# Patient Record
(demographics unavailable — no encounter records)

---

## 2024-10-10 NOTE — HISTORY OF PRESENT ILLNESS
[Former] : former [>= 20 pack years] : >= 20 pack years [Never] : never [TextBox_4] : TONY SWAIN is a 75 year old female who presents for copd fu and pfts  PMH: HLD, HTN, PUD, former smoker > 100 pack year, quit in 2021 on trelegy , rinse mouth  doing ok breathing ok  does not want to do HST    pending derm surgery   [TextBox_13] : 55 [YearQuit] : 2021

## 2024-10-10 NOTE — ASSESSMENT
[FreeTextEntry1] : Pulmonary status is maximized. Pulmonary function is adequate to tolerate proposed surgical procedure. Continue present bronchodilator therapy.  Anesthesia should be aware of underlying COPD

## 2024-10-10 NOTE — PROCEDURE
[FreeTextEntry1] : 10/2024 stable restriction on spirometry, normal rv/tlc , reduced dlco that correts PA and lateral chest xray performed using standard projections. Bones and soft tissues structures are unremarkable.Cardiac silhouette appears normal. Lung fields are clear. No infiltrate or masses noted. Mediastinal contours are normal. IMPRESSION: no acute cardiopulmonary disease   previous data reviewed:.p5/2024 R LDCT nodule unchnaged no cardiac calcifications  4/2024 moderate restriction stable, normal volume, reduced dclo that correts  10/2023 improved spirometry, volumes still slightly high, dlco improved compared to june 2023 june 2023- improved flow rates   may 2023- pfts show severe obstruction   LDCT at [facility] 5/2023 reviewed- small nodules unchanged from 2022 small GGO unchanged from 2022

## 2024-10-10 NOTE — PHYSICAL EXAM
[No Acute Distress] : no acute distress [Well Nourished] : well nourished [Well Developed] : well developed [IV] : Mallampati Class: IV [No Resp Distress] : no resp distress [No Acc Muscle Use] : no acc muscle use [Clear to Auscultation Bilaterally] : clear to auscultation bilaterally [No Focal Deficits] : no focal deficits [Oriented x3] : oriented x3 [TextBox_132] : assist device

## 2025-01-09 NOTE — PROCEDURE
[FreeTextEntry1] : previous data reviewed:  10/2024 stable restriction on spirometry, normal rv/tlc , reduced dlco that corretcs PA and lateral chest xray performed using standard projections. Bones and soft tissues structures are unremarkable.Cardiac silhouette appears normal. Lung fields are clear. No infiltrate or masses noted. Mediastinal contours are normal. IMPRESSION: no acute cardiopulmonary disease   5/2024 [facility] LDCT nodule unchnaged no cardiac calcifications  4/2024 moderate restriction stable, normal volume, reduced dclo that correts  10/2023 improved spirometry, volumes still slightly high, dlco improved compared to june 2023 june 2023- improved flow rates   may 2023- pfts show severe obstruction   LDCT at [facility] 5/2023 reviewed- small nodules unchanged from 2022 small GGO unchanged from 2022

## 2025-01-09 NOTE — HISTORY OF PRESENT ILLNESS
[Former] : former [>= 20 pack years] : >= 20 pack years [Never] : never [TextBox_4] : TONY SWAIN is a 75 year old female who presents for copd fu  PMH: HLD, HTN, PUD, former smoker > 100 pack year, quit in 2021 on trelegy , rinse mouth  doing ok breathing ok   derm surgery- cy st- removed complicated with bleeding/ infection, now resolved     [TextBox_13] : 55 [YearQuit] : 2021

## 2025-02-03 NOTE — HISTORY OF PRESENT ILLNESS
[FreeTextEntry1] : 5/24/22 Patient is a 74-year-old white female with a lifelong history of cigarette smoking discontinuing cigarette smoking about a month ago after going on Chantix. Prior cardiac evaluation has revealed extensive coronary calcifications based on CT scans with a calcium score well over thousand. Patient had undergone an angiogram in September 2021 which reviewed revealed extensive calcific coronary disease with nonobstructive lesions of about 50 to 60% in the LAD and diagonal as well as right coronary artery. Patient has been on aggressive statin therapy and attempt to drive down her LDL which she is currently was recommended to go from 40 to 80 mg of Lipitor her most recent LDL cholesterol was 70. Patient does have moderate dyspnea with walking which she attributes to her lung disease she currently is on no inhalers and is under the care of a pulmonologist. She does not have any symptoms of claudication or syncope. She does not have any symptoms of chest pain currently.  7/18/22 no change in edema pattern no sscp ,or peacock no syncope or near syncope  11/21/22 had recent HCT 33 ? etiology is on low dose lasix for leg edema no sscp has been off ASA pending eval of low HCT of 33 Off cigarettes x 1 year  2/15/23 edema of instep on off no sob or tibial edema  06/05/23 seen by PULM and Susan added CT scan of chest no change in ground-glass changes severe Coronary calcification non obstructive disease o Cath in 2021 not very mobile but denies angina trecent bloods HDL 65 LDL 71  9/18/23 mod calcification on Coronaries baseline copd on inhalers 'w dyspnea miimal walking  2/1/24 ADO nuclear in 2023 neg for ischemia  toe foot spasm at nite resolves w mobilization  on meds for copd Dr Mckee  recent LDL 62 on statin   08/01/24 Bp usually 140/80 not using diurtic and KCL  sever calcium on CAD  LDL 66   2/3/25 seen by PULM for copd  on statin for iCA plaque  dyspnea at baseline no edema

## 2025-02-03 NOTE — REVIEW OF SYSTEMS
[SOB] : shortness of breath [Leg Claudication] : intermittent leg claudication [Cough] : no cough [Coughing Up Blood] : no hemoptysis

## 2025-02-03 NOTE — END OF VISIT
[Time Spent: ___ minutes] : I have spent [unfilled] minutes of time on the encounter which excludes teaching and separately reported services. [FreeTextEntry3] : All medical record entries made by the Scribe were at my, SLIM Michael , direction and personally dictated by me on 02/01/2024 . I have reviewed the chart and agree that the record accurately reflects my personal performance of the history, physical exam, assessment and plan. I have also personally directed, reviewed, and agreed with the chart.

## 2025-02-03 NOTE — DISCUSSION/SUMMARY
[Hyperlipidemia] : hyperlipidemia [Stable] : stable [Continue] : continuing statins [Patient] : the patient [FreeTextEntry4] : copd stable on  meds  [FreeTextEntry1] : stable on meds no edema at present can hold diuretics  LICA plaque 20-49 %  TE < 20 %  Target LDL < 70  [EKG obtained to assist in diagnosis and management of assessed problem(s)] : EKG obtained to assist in diagnosis and management of assessed problem(s)

## 2025-02-03 NOTE — ADDENDUM
[FreeTextEntry1] : Kwame Dennis assisted in documentation on 2/3/25 acting as a scribe for Dr. Xu Gomez.

## 2025-07-10 NOTE — HISTORY OF PRESENT ILLNESS
[Former] : former [>= 20 pack years] : >= 20 pack years [Never] : never [TextBox_4] : TONY SWAIN is a 77 year old female who presents for copd fu  and med renewal & LDCT review  PMH: HLD, HTN, PUD, former smoker > 100 pack year, quit in 2021 on trelegy , rinse mouth  doing ok breathing ok      No changes in respiratory status [TextBox_13] : 55 [YearQuit] : 2021

## 2025-07-10 NOTE — PROCEDURE
[FreeTextEntry1] : LDCT R 2025 Mild increase in a couple pulmonary nodules  previous data reviewed:  10/2024 stable restriction on spirometry, normal rv/tlc , reduced dlco that corretcs PA and lateral chest xray performed using standard projections. Bones and soft tissues structures are unremarkable.Cardiac silhouette appears normal. Lung fields are clear. No infiltrate or masses noted. Mediastinal contours are normal. IMPRESSION: no acute cardiopulmonary disease   5/2024 [facility] LDCT nodule unchnaged no cardiac calcifications  4/2024 moderate restriction stable, normal volume, reduced dclo that correts  10/2023 improved spirometry, volumes still slightly high, dlco improved compared to june 2023 june 2023- improved flow rates   may 2023- pfts show severe obstruction   LDCT at [facility] 5/2023 reviewed- small nodules unchanged from 2022 small GGO unchanged from 2022